# Patient Record
Sex: FEMALE
[De-identification: names, ages, dates, MRNs, and addresses within clinical notes are randomized per-mention and may not be internally consistent; named-entity substitution may affect disease eponyms.]

---

## 2022-05-10 ENCOUNTER — NON-APPOINTMENT (OUTPATIENT)
Age: 32
End: 2022-05-10

## 2023-02-27 ENCOUNTER — NON-APPOINTMENT (OUTPATIENT)
Age: 33
End: 2023-02-27

## 2023-02-28 ENCOUNTER — NON-APPOINTMENT (OUTPATIENT)
Age: 33
End: 2023-02-28

## 2023-02-28 PROBLEM — Z00.00 ENCOUNTER FOR PREVENTIVE HEALTH EXAMINATION: Status: ACTIVE | Noted: 2023-02-28

## 2023-03-01 ENCOUNTER — APPOINTMENT (OUTPATIENT)
Dept: ENDOCRINOLOGY | Facility: CLINIC | Age: 33
End: 2023-03-01
Payer: COMMERCIAL

## 2023-03-01 VITALS
BODY MASS INDEX: 25.4 KG/M2 | HEIGHT: 59 IN | WEIGHT: 126 LBS | DIASTOLIC BLOOD PRESSURE: 76 MMHG | HEART RATE: 85 BPM | SYSTOLIC BLOOD PRESSURE: 111 MMHG

## 2023-03-01 DIAGNOSIS — Z86.32 PERSONAL HISTORY OF GESTATIONAL DIABETES: ICD-10-CM

## 2023-03-01 DIAGNOSIS — R79.89 OTHER SPECIFIED ABNORMAL FINDINGS OF BLOOD CHEMISTRY: ICD-10-CM

## 2023-03-01 LAB — HBA1C MFR BLD HPLC: 4.8

## 2023-03-01 PROCEDURE — 99204 OFFICE O/P NEW MOD 45 MIN: CPT | Mod: 25

## 2023-03-01 PROCEDURE — 83036 HEMOGLOBIN GLYCOSYLATED A1C: CPT | Mod: QW

## 2023-03-01 NOTE — ASSESSMENT
[FreeTextEntry1] : Thyroiditis? Will recheck TFTs today;\par Thyromegaly - US ordered.\par Hx of GDM - will repeat A1c and monitor BG.\par Pregnancy and thyroid hormones discussed.\par F/U once the results are available.\par

## 2023-03-01 NOTE — HISTORY OF PRESENT ILLNESS
[FreeTextEntry1] : A 32 yo F came to establish care and for management of recently discovered abnormal TFTs.\par As per pt, Her TSH once was 0.47 on 1/5/23\par on a repeated test on 2/21/23: TSH - 1.474; Free t4 - 1.9; TgAB -30.1\par Denies any symptoms, except for fatigue.\par Pt is pregnant - 18 weeks; the first child is 12 mo old.\par Hx of GDM with the first pregnancy - Today POC A1C -4.8\par \par Allergy to PCN, augmentin\par \par FMH -DM - maternal side.\par

## 2023-03-01 NOTE — PHYSICAL EXAM
[Alert] : alert [Well Nourished] : well nourished [Healthy Appearance] : healthy appearance [No Acute Distress] : no acute distress [Well Developed] : well developed [Normal Voice/Communication] : normal voice communication [Normal Sclera/Conjunctiva] : normal sclera/conjunctiva [No Proptosis] : no proptosis [No Lid Lag] : no lid lag [No Neck Mass] : no neck mass was observed [No Respiratory Distress] : no respiratory distress [No Accessory Muscle Use] : no accessory muscle use [Normal Rate and Effort] : normal respiratory rate and effort [Clear to Auscultation] : lungs were clear to auscultation bilaterally [Normal PMI] : the apical impulse was normal [Normal S1, S2] : normal S1 and S2 [Normal Rate] : heart rate was normal [Regular Rhythm] : with a regular rhythm [No Edema] : no peripheral edema [Spine Straight] : spine straight [No Stigmata of Cushings Syndrome] : no stigmata of Cushings Syndrome [Normal Gait] : normal gait [No Clubbing, Cyanosis] : no clubbing  or cyanosis of the fingernails [No Involuntary Movements] : no involuntary movements were seen [No Joint Swelling] : no joint swelling seen [No Rash] : no rash [No Skin Lesions] : no skin lesions [Acanthosis Nigricans] : no acanthosis nigricans [Hirsutism] : no hirsutism [No Tremors] : no tremors [Oriented x3] : oriented to person, place, and time [de-identified] : Thyromegaly R side

## 2023-03-06 ENCOUNTER — TRANSCRIPTION ENCOUNTER (OUTPATIENT)
Age: 33
End: 2023-03-06

## 2023-03-06 ENCOUNTER — OUTPATIENT (OUTPATIENT)
Dept: OUTPATIENT SERVICES | Facility: HOSPITAL | Age: 33
LOS: 1 days | End: 2023-03-06

## 2023-03-06 ENCOUNTER — APPOINTMENT (OUTPATIENT)
Dept: ULTRASOUND IMAGING | Facility: CLINIC | Age: 33
End: 2023-03-06
Payer: COMMERCIAL

## 2023-03-06 PROCEDURE — 76536 US EXAM OF HEAD AND NECK: CPT | Mod: 26

## 2023-03-09 ENCOUNTER — TRANSCRIPTION ENCOUNTER (OUTPATIENT)
Age: 33
End: 2023-03-09

## 2023-03-09 LAB
ALBUMIN SERPL ELPH-MCNC: 4.2 G/DL
ALP BLD-CCNC: 43 U/L
ALT SERPL-CCNC: 16 U/L
ANION GAP SERPL CALC-SCNC: 11 MMOL/L
AST SERPL-CCNC: 21 U/L
BILIRUB SERPL-MCNC: 0.2 MG/DL
BUN SERPL-MCNC: 8 MG/DL
CALCIUM SERPL-MCNC: 10.1 MG/DL
CHLORIDE SERPL-SCNC: 104 MMOL/L
CO2 SERPL-SCNC: 23 MMOL/L
CREAT SERPL-MCNC: 0.48 MG/DL
EGFR: 127 ML/MIN/1.73M2
ERYTHROCYTE [SEDIMENTATION RATE] IN BLOOD BY WESTERGREN METHOD: 28 MM/HR
ESTIMATED AVERAGE GLUCOSE: 103 MG/DL
GLUCOSE SERPL-MCNC: 84 MG/DL
HBA1C MFR BLD HPLC: 5.2 %
POTASSIUM SERPL-SCNC: 4.6 MMOL/L
PROT SERPL-MCNC: 7.1 G/DL
SODIUM SERPL-SCNC: 139 MMOL/L
T3 SERPL-MCNC: 120 NG/DL
T4 FREE SERPL-MCNC: 1.1 NG/DL
THYROGLOB AB SERPL-ACNC: <20 IU/ML
THYROPEROXIDASE AB SERPL IA-ACNC: <10 IU/ML
TSH SERPL-ACNC: 1.06 UIU/ML
TSI ACT/NOR SER: <0.1 IU/L